# Patient Record
Sex: MALE | Race: WHITE | NOT HISPANIC OR LATINO | Employment: FULL TIME | URBAN - METROPOLITAN AREA
[De-identification: names, ages, dates, MRNs, and addresses within clinical notes are randomized per-mention and may not be internally consistent; named-entity substitution may affect disease eponyms.]

---

## 2017-08-01 ENCOUNTER — OFFICE VISIT (OUTPATIENT)
Dept: FAMILY MEDICINE | Facility: CLINIC | Age: 42
End: 2017-08-01
Payer: COMMERCIAL

## 2017-08-01 VITALS
SYSTOLIC BLOOD PRESSURE: 144 MMHG | BODY MASS INDEX: 34.26 KG/M2 | DIASTOLIC BLOOD PRESSURE: 98 MMHG | HEIGHT: 70 IN | TEMPERATURE: 98 F | HEART RATE: 80 BPM | WEIGHT: 239.31 LBS

## 2017-08-01 DIAGNOSIS — R63.2 POLYPHAGIA(783.6): ICD-10-CM

## 2017-08-01 DIAGNOSIS — R73.09 HEMOGLOBIN A1C 8.0 PERCENT OR GREATER: ICD-10-CM

## 2017-08-01 DIAGNOSIS — R63.1 POLYDIPSIA: ICD-10-CM

## 2017-08-01 DIAGNOSIS — R73.9 ELEVATED BLOOD SUGAR: ICD-10-CM

## 2017-08-01 DIAGNOSIS — R35.89 POLYURIA: ICD-10-CM

## 2017-08-01 DIAGNOSIS — E11.9 NEWLY DIAGNOSED DIABETES: Primary | ICD-10-CM

## 2017-08-01 LAB
CREAT UR-MCNC: 66 MG/DL
MICROALBUMIN UR DL<=1MG/L-MCNC: 7 UG/ML
MICROALBUMIN/CREATININE RATIO: 10.6 UG/MG

## 2017-08-01 PROCEDURE — 99213 OFFICE O/P EST LOW 20 MIN: CPT | Mod: S$GLB,,, | Performed by: NURSE PRACTITIONER

## 2017-08-01 PROCEDURE — 82570 ASSAY OF URINE CREATININE: CPT

## 2017-08-01 PROCEDURE — 99999 PR PBB SHADOW E&M-EST. PATIENT-LVL III: CPT | Mod: PBBFAC,,, | Performed by: NURSE PRACTITIONER

## 2017-08-01 RX ORDER — CYCLOBENZAPRINE HCL 10 MG
10 TABLET ORAL
COMMUNITY

## 2017-08-01 NOTE — PROGRESS NOTES
Subjective:       Patient ID: Jaime Kennedy is a 42 y.o. male.    Chief Complaint: Abnormal Lab (elevated sugar per Dr Godwin (HgbA1C  >11)_)    Diabetes   He presents for his initial diabetic visit. He has type 2 diabetes mellitus. No MedicAlert identification noted. Onset time: Hgb A1C checked by urologist this week; 11.4. Disease course: New onset. There are no hypoglycemic associated symptoms. Associated symptoms include polydipsia, polyphagia and polyuria. Pertinent negatives for diabetes include no blurred vision, no chest pain, no fatigue, no foot paresthesias, no foot ulcerations, no visual change, no weakness and no weight loss. There are no hypoglycemic complications. Diabetic symptom progression: New onset. There are no diabetic complications. Risk factors for coronary artery disease include diabetes mellitus, male sex and obesity. When asked about current treatments, none (Newly diagnosed; repeat hgb a1c, fasting BG ordered) were reported.   Pt informed regimen will begin following review of lab results. Extensive education provided r/t new onset diabetes. Pt informed ACE inhibitor, ASA recommended with diabetes diagnosis. Pt also informed yearly eye and foot exams needed. Metformin uses, potential side effects discussed; informed will begin treatment with this medication; referral to diabetic education will also be provided. Glucometer kit to be ordered; recommend AC/HS glucose monitoring; informed will teach glucometer use. Pt verbalized understanding.   History reviewed. No pertinent past medical history.  Social History     Social History    Marital status:      Spouse name: N/A    Number of children: N/A    Years of education: N/A     Occupational History         Social History Main Topics    Smoking status: Never Smoker    Smokeless tobacco: Not on file    Alcohol use Not on file    Drug use: Unknown    Sexual activity: Not on file     Social History Narrative         Past Surgical  History:   Procedure Laterality Date    FRACTURE SURGERY      tendonitis surgery         Review of Systems   Constitutional: Negative.  Negative for fatigue and weight loss.   HENT: Negative.    Eyes: Negative.  Negative for blurred vision.   Respiratory: Negative.    Cardiovascular: Negative.  Negative for chest pain.   Gastrointestinal: Negative.    Endocrine: Positive for polydipsia, polyphagia and polyuria.   Genitourinary: Negative.    Musculoskeletal: Negative.    Skin: Negative.    Allergic/Immunologic: Negative.    Neurological: Negative.  Negative for weakness.   Psychiatric/Behavioral: Negative.        Objective:      Physical Exam   Constitutional: He is oriented to person, place, and time. He appears well-developed and well-nourished.   HENT:   Head: Normocephalic.   Right Ear: External ear normal.   Left Ear: External ear normal.   Nose: Nose normal.   Mouth/Throat: Oropharynx is clear and moist.   Eyes: Conjunctivae are normal. Pupils are equal, round, and reactive to light.   Neck: Normal range of motion. Neck supple.   Cardiovascular: Normal rate, regular rhythm and normal heart sounds.    Pulmonary/Chest: Effort normal and breath sounds normal.   Abdominal: Soft. Bowel sounds are normal.   Musculoskeletal: Normal range of motion.   Neurological: He is alert and oriented to person, place, and time.   Skin: Skin is warm and dry. Capillary refill takes 2 to 3 seconds.   Psychiatric: He has a normal mood and affect. His behavior is normal. Judgment and thought content normal.   Nursing note and vitals reviewed.      Assessment:       1. Newly diagnosed diabetes    2. Hemoglobin A1c 8.0 percent or greater    3. Elevated blood sugar    4. Polyuria    5. Polydipsia    6. Polyphagia        Plan:           Jaime was seen today for abnormal lab.    Diagnoses and all orders for this visit:    Newly diagnosed diabetes  Hemoglobin A1c 8.0 percent or greater  Elevated blood  sugar  Polyuria  Polydipsia  Polyphagia  -     Hemoglobin A1c; Future  -     Basic metabolic panel; Future  -     MICROALBUMIN / CREATININE RATIO URINE

## 2017-08-02 ENCOUNTER — LAB VISIT (OUTPATIENT)
Dept: LAB | Facility: HOSPITAL | Age: 42
End: 2017-08-02
Attending: NURSE PRACTITIONER
Payer: COMMERCIAL

## 2017-08-02 DIAGNOSIS — R63.2 POLYPHAGIA(783.6): ICD-10-CM

## 2017-08-02 DIAGNOSIS — Z79.899 ENCOUNTER FOR LONG-TERM (CURRENT) USE OF OTHER MEDICATIONS: Primary | ICD-10-CM

## 2017-08-02 DIAGNOSIS — R73.09 HEMOGLOBIN A1C 8.0 PERCENT OR GREATER: ICD-10-CM

## 2017-08-02 DIAGNOSIS — R35.89 POLYURIA: ICD-10-CM

## 2017-08-02 DIAGNOSIS — R73.9 ELEVATED BLOOD SUGAR: ICD-10-CM

## 2017-08-02 DIAGNOSIS — R63.1 POLYDIPSIA: ICD-10-CM

## 2017-08-02 DIAGNOSIS — E29.1 OTHER TESTICULAR HYPOFUNCTION: ICD-10-CM

## 2017-08-02 DIAGNOSIS — E11.9 NEWLY DIAGNOSED DIABETES: ICD-10-CM

## 2017-08-02 LAB
ANION GAP SERPL CALC-SCNC: 9 MMOL/L
BUN SERPL-MCNC: 10 MG/DL
CALCIUM SERPL-MCNC: 9 MG/DL
CHLORIDE SERPL-SCNC: 100 MMOL/L
CO2 SERPL-SCNC: 26 MMOL/L
CREAT SERPL-MCNC: 0.8 MG/DL
EST. GFR  (AFRICAN AMERICAN): >60 ML/MIN/1.73 M^2
EST. GFR  (NON AFRICAN AMERICAN): >60 ML/MIN/1.73 M^2
FSH SERPL-ACNC: 6.6 MIU/ML
GLUCOSE SERPL-MCNC: 254 MG/DL
HCT VFR BLD AUTO: 46 %
HGB BLD-MCNC: 16.6 G/DL
LH SERPL-ACNC: 3.6 MIU/ML
POTASSIUM SERPL-SCNC: 4.4 MMOL/L
PROLACTIN SERPL IA-MCNC: 10.4 NG/ML
SODIUM SERPL-SCNC: 135 MMOL/L
TESTOST SERPL-MCNC: 306 NG/DL

## 2017-08-02 PROCEDURE — 83036 HEMOGLOBIN GLYCOSYLATED A1C: CPT

## 2017-08-02 PROCEDURE — 36415 COLL VENOUS BLD VENIPUNCTURE: CPT | Mod: PO

## 2017-08-02 PROCEDURE — 85014 HEMATOCRIT: CPT

## 2017-08-02 PROCEDURE — 84403 ASSAY OF TOTAL TESTOSTERONE: CPT

## 2017-08-02 PROCEDURE — 83002 ASSAY OF GONADOTROPIN (LH): CPT

## 2017-08-02 PROCEDURE — 85018 HEMOGLOBIN: CPT

## 2017-08-02 PROCEDURE — 84146 ASSAY OF PROLACTIN: CPT

## 2017-08-02 PROCEDURE — 80048 BASIC METABOLIC PNL TOTAL CA: CPT

## 2017-08-02 PROCEDURE — 83001 ASSAY OF GONADOTROPIN (FSH): CPT

## 2017-08-03 LAB
ESTIMATED AVG GLUCOSE: 272 MG/DL
HBA1C MFR BLD HPLC: 11.1 %

## 2017-08-03 RX ORDER — LISINOPRIL 2.5 MG/1
2.5 TABLET ORAL DAILY
Qty: 90 TABLET | Refills: 3 | Status: SHIPPED | OUTPATIENT
Start: 2017-08-03 | End: 2018-08-03

## 2017-08-03 RX ORDER — METFORMIN HYDROCHLORIDE 500 MG/1
500 TABLET ORAL 2 TIMES DAILY WITH MEALS
Qty: 180 TABLET | Refills: 3 | Status: SHIPPED | OUTPATIENT
Start: 2017-08-03 | End: 2017-08-24

## 2017-08-03 RX ORDER — ASPIRIN 81 MG/1
81 TABLET ORAL DAILY
Qty: 90 TABLET | Refills: 0 | Status: SHIPPED | OUTPATIENT
Start: 2017-08-03 | End: 2018-08-03

## 2017-08-03 RX ORDER — INSULIN PUMP SYRINGE, 3 ML
EACH MISCELLANEOUS
Qty: 1 EACH | Refills: 0 | Status: SHIPPED | OUTPATIENT
Start: 2017-08-03 | End: 2018-08-03

## 2017-08-03 RX ORDER — ASPIRIN 81 MG/1
81 TABLET ORAL DAILY
Refills: 0 | COMMUNITY
Start: 2017-08-03 | End: 2017-08-03 | Stop reason: SDUPTHER

## 2017-08-03 NOTE — TELEPHONE ENCOUNTER
----- Message from Delmi Fisher sent at 8/3/2017 12:34 PM CDT -----  Contact: pt   States he's calling to find out if there is a specific time on tomorrow he is to come in tomorrow or whom he should see and can be reached at [phone]//[email or web address]

## 2017-08-23 ENCOUNTER — TELEPHONE (OUTPATIENT)
Dept: FAMILY MEDICINE | Facility: CLINIC | Age: 42
End: 2017-08-23

## 2017-08-23 ENCOUNTER — PATIENT MESSAGE (OUTPATIENT)
Dept: FAMILY MEDICINE | Facility: CLINIC | Age: 42
End: 2017-08-23

## 2017-08-23 ENCOUNTER — LAB VISIT (OUTPATIENT)
Dept: LAB | Facility: HOSPITAL | Age: 42
End: 2017-08-23
Attending: FAMILY MEDICINE
Payer: COMMERCIAL

## 2017-08-23 ENCOUNTER — OFFICE VISIT (OUTPATIENT)
Dept: FAMILY MEDICINE | Facility: CLINIC | Age: 42
End: 2017-08-23
Payer: COMMERCIAL

## 2017-08-23 VITALS
WEIGHT: 244 LBS | TEMPERATURE: 98 F | DIASTOLIC BLOOD PRESSURE: 82 MMHG | HEIGHT: 70 IN | SYSTOLIC BLOOD PRESSURE: 138 MMHG | BODY MASS INDEX: 34.93 KG/M2 | HEART RATE: 83 BPM

## 2017-08-23 PROBLEM — E78.1 HYPERTRIGLYCERIDEMIA: Status: ACTIVE | Noted: 2017-08-23

## 2017-08-23 LAB
CHOLEST/HDLC SERPL: 5.5 {RATIO}
HDL/CHOLESTEROL RATIO: 18.1 %
HDLC SERPL-MCNC: 155 MG/DL
HDLC SERPL-MCNC: 28 MG/DL
LDLC SERPL CALC-MCNC: 59.2 MG/DL
NONHDLC SERPL-MCNC: 127 MG/DL
TRIGL SERPL-MCNC: 339 MG/DL

## 2017-08-23 PROCEDURE — 90471 IMMUNIZATION ADMIN: CPT | Mod: S$GLB,,, | Performed by: FAMILY MEDICINE

## 2017-08-23 PROCEDURE — 3046F HEMOGLOBIN A1C LEVEL >9.0%: CPT | Mod: S$GLB,,, | Performed by: FAMILY MEDICINE

## 2017-08-23 PROCEDURE — 80061 LIPID PANEL: CPT

## 2017-08-23 PROCEDURE — 99214 OFFICE O/P EST MOD 30 MIN: CPT | Mod: S$GLB,,, | Performed by: FAMILY MEDICINE

## 2017-08-23 PROCEDURE — 36415 COLL VENOUS BLD VENIPUNCTURE: CPT | Mod: PO

## 2017-08-23 PROCEDURE — 4010F ACE/ARB THERAPY RXD/TAKEN: CPT | Mod: S$GLB,,, | Performed by: FAMILY MEDICINE

## 2017-08-23 PROCEDURE — 99999 PR PBB SHADOW E&M-EST. PATIENT-LVL IV: CPT | Mod: PBBFAC,,, | Performed by: FAMILY MEDICINE

## 2017-08-23 PROCEDURE — 90732 PPSV23 VACC 2 YRS+ SUBQ/IM: CPT | Mod: S$GLB,,, | Performed by: FAMILY MEDICINE

## 2017-08-23 PROCEDURE — 3008F BODY MASS INDEX DOCD: CPT | Mod: S$GLB,,, | Performed by: FAMILY MEDICINE

## 2017-08-23 PROCEDURE — 83036 HEMOGLOBIN GLYCOSYLATED A1C: CPT

## 2017-08-23 RX ORDER — LANCETS
1 EACH MISCELLANEOUS DAILY PRN
Qty: 100 EACH | Refills: 11 | Status: SHIPPED | OUTPATIENT
Start: 2017-08-23

## 2017-08-23 NOTE — PROGRESS NOTES
Subjective:      Patient ID: Jaime Kennedy is a 42 y.o. male.    Chief Complaint: diabetes.  HPI he is a new diabetic and he has not been checking his sugars.  We discussed how to use the glucometer.  He was put on metformin 500 mg po bid.   Since he was diagnosed with diabetes, he has changed the way that he has been eating.  He states that he has not had low sugar symtpoms.     Lab Results   Component Value Date    HGBA1C 11.1 (H) 08/02/2017     He has changed from eating a lot of carbs. He now eats oatmeal with nuts and fruit or cheese grits and eggs. He has been only drinking coffee with sweetener and not sugar, tea with a sweetener and he rarely drinks a coke or pop.  He drinks a lot of water now.  He rarely eats fried foods.  He travels a lot.  He is being more mindful of where he is eating.  He has cut out cookies.  He snacks on almonds.    She has a family history of diabetes.     Health Maintenance Due   Topic Date Due    Lipid Panel  1974    Foot Exam  02/11/1984    Eye Exam  02/11/1984    TETANUS VACCINE  02/11/1992    Pneumococcal PPSV23 (Medium Risk) (1) 02/11/1992    Influenza Vaccine  08/01/2017       Past Medical History:  History reviewed. No pertinent past medical history.  Past Surgical History:   Procedure Laterality Date    FRACTURE SURGERY      tendonitis surgery       Review of patient's allergies indicates:  No Known Allergies  Current Outpatient Prescriptions on File Prior to Visit   Medication Sig Dispense Refill    aspirin (ECOTRIN) 81 MG EC tablet Take 1 tablet (81 mg total) by mouth once daily. 90 tablet 0    blood-glucose meter kit Use as instructed 1 each 0    cyclobenzaprine (FLEXERIL) 10 MG tablet Take 10 mg by mouth.      fluticasone (FLONASE) 50 mcg/actuation nasal spray 2 sprays by Each Nare route once daily. 16 g 12    lisinopril (PRINIVIL,ZESTRIL) 2.5 MG tablet Take 1 tablet (2.5 mg total) by mouth once daily. 90 tablet 3    metformin (GLUCOPHAGE) 500 MG  "tablet Take 1 tablet (500 mg total) by mouth 2 (two) times daily with meals. 180 tablet 3    levocetirizine (XYZAL) 5 MG tablet Take 1 tablet (5 mg total) by mouth every evening. 14 tablet 0     No current facility-administered medications on file prior to visit.      Social History     Social History    Marital status:      Spouse name: N/A    Number of children: N/A    Years of education: N/A     Occupational History    Not on file.     Social History Main Topics    Smoking status: Never Smoker    Smokeless tobacco: Not on file    Alcohol use Not on file    Drug use: Unknown    Sexual activity: Not on file     Other Topics Concern    Not on file     Social History Narrative    No narrative on file     Family History   Problem Relation Age of Onset    No Known Problems Mother     Heart disease Father     Diabetes Maternal Grandfather              Review of Systems   Constitutional: Negative.  Negative for chills, diaphoresis and fever.   HENT: Negative for congestion, hearing loss, mouth sores, postnasal drip and sore throat.    Eyes: Negative for pain and visual disturbance.   Respiratory: Negative for cough, chest tightness, shortness of breath and wheezing.    Cardiovascular: Negative for chest pain.   Gastrointestinal: Negative for abdominal pain, anal bleeding, blood in stool, constipation, diarrhea, nausea and vomiting.   Genitourinary: Negative for dysuria and hematuria.   Musculoskeletal: Negative for back pain, neck pain and neck stiffness.   Skin: Negative for rash.   Neurological: Negative for dizziness and weakness.       Objective:   /82   Pulse 83   Temp 98.3 °F (36.8 °C) (Oral)   Ht 5' 10" (1.778 m)   Wt 110.7 kg (244 lb)   BMI 35.01 kg/m²     Physical Exam   Constitutional: He is oriented to person, place, and time. He appears well-developed and well-nourished.   Cardiovascular:   Pulses:       Dorsalis pedis pulses are 2+ on the right side, and 2+ on the left side. "        Posterior tibial pulses are 2+ on the right side, and 2+ on the left side.   Musculoskeletal:        Right foot: There is normal range of motion and no deformity.        Left foot: There is normal range of motion and no deformity.   Feet:   Right Foot:   Protective Sensation: 10 sites tested. 10 sites sensed.   Skin Integrity: Negative for ulcer, blister, skin breakdown, erythema, warmth, callus or dry skin.   Left Foot:   Protective Sensation: 10 sites tested. 10 sites sensed.   Skin Integrity: Negative for ulcer, blister, skin breakdown, erythema, warmth, callus or dry skin.   Neurological: He is alert and oriented to person, place, and time.       Assessment:     1. Uncontrolled type 2 diabetes mellitus without complication, without long-term current use of insulin        Plan:   Diagnoses and all orders for this visit:    Uncontrolled type 2 diabetes mellitus without complication, without long-term current use of insulin  -     lancets Misc; 1 Units by Misc.(Non-Drug; Combo Route) route daily as needed.  -     glucose urine test-glucose ox (NO-STICK GLUCOSE) Strp; Use 1 qid  -     MyChart Patient Entered Glucose  -     Lipid panel; Future  -     Pneumococcal Polysaccharide Vaccine (23 Valent) (SQ/IM)  -     Ambulatory Referral to Diabetes Education  -     Hemoglobin A1c; Future  -     Ambulatory referral to Optometry

## 2017-08-24 ENCOUNTER — TELEPHONE (OUTPATIENT)
Dept: FAMILY MEDICINE | Facility: CLINIC | Age: 42
End: 2017-08-24

## 2017-08-24 ENCOUNTER — OFFICE VISIT (OUTPATIENT)
Dept: DIABETES | Facility: CLINIC | Age: 42
End: 2017-08-24
Payer: COMMERCIAL

## 2017-08-24 VITALS
SYSTOLIC BLOOD PRESSURE: 146 MMHG | DIASTOLIC BLOOD PRESSURE: 96 MMHG | HEIGHT: 70 IN | WEIGHT: 244.19 LBS | BODY MASS INDEX: 34.96 KG/M2

## 2017-08-24 DIAGNOSIS — E78.1 HYPERTRIGLYCERIDEMIA: ICD-10-CM

## 2017-08-24 DIAGNOSIS — E78.5 HYPERLIPIDEMIA, UNSPECIFIED HYPERLIPIDEMIA TYPE: Primary | ICD-10-CM

## 2017-08-24 DIAGNOSIS — R73.9 HYPERGLYCEMIA: Primary | ICD-10-CM

## 2017-08-24 DIAGNOSIS — E66.9 OBESITY (BMI 30-39.9): ICD-10-CM

## 2017-08-24 LAB
ESTIMATED AVG GLUCOSE: 226 MG/DL
GLUCOSE SERPL-MCNC: 256 MG/DL (ref 70–110)
HBA1C MFR BLD HPLC: 9.5 %

## 2017-08-24 PROCEDURE — 3046F HEMOGLOBIN A1C LEVEL >9.0%: CPT | Mod: S$GLB,,, | Performed by: NURSE PRACTITIONER

## 2017-08-24 PROCEDURE — 82948 REAGENT STRIP/BLOOD GLUCOSE: CPT | Mod: S$GLB,,, | Performed by: NURSE PRACTITIONER

## 2017-08-24 PROCEDURE — 3008F BODY MASS INDEX DOCD: CPT | Mod: S$GLB,,, | Performed by: NURSE PRACTITIONER

## 2017-08-24 PROCEDURE — 99214 OFFICE O/P EST MOD 30 MIN: CPT | Mod: S$GLB,,, | Performed by: NURSE PRACTITIONER

## 2017-08-24 PROCEDURE — 99999 PR PBB SHADOW E&M-EST. PATIENT-LVL III: CPT | Mod: PBBFAC,,, | Performed by: NURSE PRACTITIONER

## 2017-08-24 PROCEDURE — 4010F ACE/ARB THERAPY RXD/TAKEN: CPT | Mod: S$GLB,,, | Performed by: NURSE PRACTITIONER

## 2017-08-24 RX ORDER — METFORMIN HYDROCHLORIDE 1000 MG/1
1000 TABLET ORAL 2 TIMES DAILY WITH MEALS
Qty: 180 TABLET | Refills: 3 | Status: SHIPPED | OUTPATIENT
Start: 2017-08-24 | End: 2017-12-26 | Stop reason: SDUPTHER

## 2017-08-24 NOTE — PROGRESS NOTES
Use a low triglyceride diet to lower this triglyceride which is high.  To do this avoid fried foods, fast food, red meats and use more plants in your diet.  Start two over the counter fish oils tabs a day and recheck a cholesterol panel in 6 months.

## 2017-08-24 NOTE — PROGRESS NOTES
Change the metformin to 1000 mg po bid #60 and rf x 2 and recheck an a1c in 3 months.  Continue with plans to send me results of using the glucometer.  Call him and ask him if he would like for us to arrange for him to get educated on how to use the glucometer by the ancillary nurse.  I forgot to get this arranged yesterday when I spoke with him.

## 2017-08-24 NOTE — TELEPHONE ENCOUNTER
Please order the changes in meds and pend them to me when we do change a med or delete the old ones off of the medcard if we change to something else.     I have signed for the following orders AND/OR meds.  Please call the patient and ask the patient to schedule the testing AND/OR inform about any medications that were sent.      Orders Placed This Encounter   Procedures    Lipid panel     Standing Status:   Future     Standing Expiration Date:   10/23/2018    Hepatic function panel     Standing Status:   Future     Standing Expiration Date:   10/23/2018         Medications Ordered This Encounter      metformin (GLUCOPHAGE) 1000 MG tablet          Sig: Take 1 tablet (1,000 mg total) by mouth 2 (two) times daily with meals.          Dispense:  180 tablet          Refill:  3

## 2017-08-24 NOTE — TELEPHONE ENCOUNTER
----- Message from Manuel Morales MD sent at 8/23/2017 10:00 PM CDT -----  Use a low triglyceride diet to lower this triglyceride which is high.  To do this avoid fried foods, fast food, red meats and use more plants in your diet.  Start two over the counter fish oils tabs a day and recheck a cholesterol panel in 6 months.

## 2017-08-24 NOTE — LETTER
August 24, 2017      Maunel Morales MD  [address] [facility] [address]           [facility]  [address] [facility] [address]  Phone: [phone]  Fax: [phone]          Patient: Jaime Kennedy   MR Number: [id number]   YOB: 1975   Date of Visit: 8/24/2017       Dear Dr. Manuel Morales:    Thank you for referring Jaime Kennedy to me for evaluation. Attached you will find relevant portions of my assessment and plan of care.    If you have questions, please do not hesitate to call me. I look forward to following Jaime Kennedy along with you.    Sincerely,    Rachael Cartagena, HAYLEEC, CDE    Enclosure  CC:  No Recipients    If you would like to receive this communication electronically, please contact [email or web address] or [phone] to request more information on [facility] Link access.    For providers and/or their staff who would like to refer a patient to [facility], please contact us through our one-stop-shop provider referral line, [facility] , at [phone].    If you feel you have received this communication in error or would no longer like to receive these types of communications, please e-mail [email or web address]

## 2017-08-24 NOTE — PROGRESS NOTES
"PCP: Manuel Morales MD  //  Dr. Jamar Godwin ( urology )    Subjective:     Chief Complaint: Diabetes, establish care    HISTORY OF PRESENT ILLNESS: 42 year old  male presenting to establish care for diabetes.  Patient was recently diagnosed with  Type II diabetes and has the following complications: none. He  is to be enrolled in diabetes education classes.  Blood glucose testing is not performed, but patient has meter with him today.      He needs education on how to use glucometer.  He denies any recent hospital admissions, emergency room visits, or hypoglycemia.  He is scheduled for a cruise in the next coming weeks.    Height: 5' 10" (177.8 cm)  //  Weight: 110.8 kg (244 lb 3.2 oz), Body mass index is 35.04 kg/m².  Home Blood Glucose reading this AM: Not Taken  His blood sugar in clinic today is:    Lab Results   Component Value Date    POCGLU 256 (A) 08/24/2017     Labs Reviewed. ADA recommends A1C of less than 7 %. His most recent A1C is:     Lab Results   Component Value Date    HGBA1C 9.5 (H) 08/23/2017      DM MEDICATIONS:   Metformin 1,000 mg BID    Review of Systems   Constitutional: Negative for appetite change, diaphoresis, fatigue and unexpected weight change.   HENT: Negative for congestion, hearing loss, rhinorrhea, sneezing and sore throat.    Eyes: Negative for visual disturbance.   Respiratory: Negative for cough, shortness of breath and wheezing.    Cardiovascular: Negative for leg swelling.   Gastrointestinal: Negative for abdominal pain, constipation, diarrhea, nausea and vomiting.   Endocrine: Positive for polydipsia. Negative for cold intolerance, heat intolerance, polyphagia and polyuria.   Genitourinary: Negative for difficulty urinating, dysuria and urgency.   Skin: Negative for color change, pallor and wound.   Neurological: Negative for dizziness, seizures, syncope, numbness and headaches.   Psychiatric/Behavioral: Negative for confusion and decreased concentration. The " patient is not nervous/anxious.        STANDARDS OF CARE:  Current Ophthalmologist / Optometrist: Robert Elder, Last exam November 2016  Current Podiatrist: None  Recommend regular exams and denies gums bleeding.    ACTIVITY LEVEL: Rarely Active  EXERCISE:  None  MEAL PLANNING: Patient reports number of meals per day to be 3 and number of snacks per day to be 2.  Breakfast can be oatmeal, nuts & fruits OR cheese grits, eggs.  Lunch or dinner can be onion, bell pepper, steak w/ wheat chips OR spaghetti squash OR [employer] 's pizza.  Beverages includes mostly water, unsweet tea with artifical.  Patient is encouraged to consume 45 - 60 grams of carbohydrates in each meal, and 1800 k / romain per day.      Per dietary recall, patient is not limiting carbohydrates, saturated fats and sodium.     BLOOD GLUCOSE TESTING:  Has TRUE METRIX meter  SOCIAL HISTORY: . Lives with spouse.  Works in the plant.  Never smoker.  Has occasional whiskey.    Objective:      Physical Exam   Constitutional: He is oriented to person, place, and time. He appears well-developed and well-nourished.   HENT:   Head: Normocephalic and atraumatic.   Eyes: EOM are normal. Pupils are equal, round, and reactive to light.   Neck: Normal range of motion. No tracheal deviation present.   Cardiovascular: Normal rate, regular rhythm and intact distal pulses.  Exam reveals no friction rub.    No murmur heard.  Pulses:       Dorsalis pedis pulses are 2+ on the right side, and 2+ on the left side.   Pulmonary/Chest: Effort normal and breath sounds normal. He has no wheezes.   Abdominal: Soft. Bowel sounds are normal. He exhibits no distension. There is no tenderness.   Musculoskeletal: Normal range of motion. He exhibits no edema or deformity.   Feet:   Right Foot:   Protective Sensation: 6 sites tested. 6 sites sensed.   Skin Integrity: Negative for ulcer, blister, skin breakdown, erythema, warmth, callus or dry skin.   Left Foot:   Protective Sensation: 6  sites tested. 6 sites sensed.   Skin Integrity: Negative for ulcer, blister, skin breakdown, erythema, warmth, callus or dry skin.   Neurological: He is alert and oriented to person, place, and time.   Skin: Skin is warm and dry. No rash noted.   Psychiatric: He has a normal mood and affect. His behavior is normal. Judgment and thought content normal.       Assessment / Plan:     1.) Uncontrolled type 2 diabetes mellitus without complication, without long-term current use of insulin  Comments:  - Continue metformin 1,000 mg BID.  He has not started monitoring blood sugars,but plans to start checking BID.  In 3 weeks, patient will send BG readings via my [facility] so we can  reassess readings and if not at goal ( see below ), will plan  to start SGLT-2 to help lower blood sugars.    Orders:  -     POCT glucose    2.) Hypertriglyceridemia    3.) Obesity (BMI 30-39.9)    Additional Plan Details:    1.) Patient was instructed to monitor blood glucose 2 x daily, fasting and pp meal.  Discussed ADA goal for fasting blood sugar, 80 - 130 mg/dL; pp blood sugars below 180 mg/dl. Also, discussed prevention of hypoglycemia and the need to adjust goals to higher levels if persistent hypoglycemia.  Reminded to bring BG records or meter to each visit for review.  In clinic today, I demonstrated how to use the glucometer and he voiced understanding.   2.) Reviewed pathophysiology of diabetes, complications related to the disease, importance of annual dilated eye exam and daily foot examination.  3.) We discussed the ADA recommendations: Hemoglobin A1c below 7.0 %. All patients with diabetes should be on statins unless contraindicated.  ACE or ARB therapy if not contraindicated.    4.) Meal planning teaching: Carbohydrate definition - one serving is 15 gms. Carbohydrate spacing - carbohydrates should be spaced into approximately 3 meals with 2 snacks ( of one carbohydrate ) between meals or at bedtime. Increase vegetable intake to 2  or more cups of vegetables per day as well as 2 fruit servings. Recommended low saturated fat, low sodium diet to aid in control of hypertension and cholesterol.  5.) Discussed activity, benefits, methods, and precautions. Recommended patient start or continue some form of exercise and increase as tolerated to 30 minutes per day to facilitate weight loss and aid in control of BGs.  6.) Return to clinic in 3 months for follow up. He was explained the above plan and given opportunity to ask questions. Advised to call clinic with any further questions or concerns.    Rachael Cartagena, SADIA-C, CDE    A total of 60 minutes was spent in face to face time, of which over 50 % was spent in counseling patient on disease process, complications, treatment, and side effects of medications.

## 2017-08-24 NOTE — TELEPHONE ENCOUNTER
----- Message from Manuel Morales MD sent at 8/24/2017  6:59 AM CDT -----  Change the metformin to 1000 mg po bid #60 and rf x 2 and recheck an a1c in 3 months.  Continue with plans to send me results of using the glucometer.  Call him and ask him if he would like for us to arrange for him to get educated on how to use the glucometer by the ancillary nurse.  I forgot to get this arranged yesterday when I spoke with him.

## 2017-08-24 NOTE — TELEPHONE ENCOUNTER
I have signed for the following orders AND/OR meds.  Please call the patient and ask the patient to schedule the testing AND/OR inform about any medications that were sent.      Orders Placed This Encounter   Procedures    Hemoglobin A1c     Standing Status:   Future     Standing Expiration Date:   10/23/2018

## 2017-09-21 ENCOUNTER — TELEPHONE (OUTPATIENT)
Dept: DIABETES | Facility: CLINIC | Age: 42
End: 2017-09-21

## 2017-09-21 NOTE — TELEPHONE ENCOUNTER
I called patient and spoke with him regarding blood sugar readings.  He stated that fasting BG are 160 s - 180 s; and afternoon 130 s - 180 s.  He is still taking metformin.  Once again, I strongly suggested starting SGLT-2, but patient wants to monitor his BG another 2 weeks and call back with readings.  He voices he just went on a cruise and was not following his diet plan.

## 2017-10-10 ENCOUNTER — TELEPHONE (OUTPATIENT)
Dept: OPHTHALMOLOGY | Facility: CLINIC | Age: 42
End: 2017-10-10

## 2017-10-11 ENCOUNTER — TELEPHONE (OUTPATIENT)
Dept: DIABETES | Facility: CLINIC | Age: 42
End: 2017-10-11

## 2017-10-11 NOTE — TELEPHONE ENCOUNTER
Attempted to call patient to inquire about blood sugar readings, but no response. Left VM to call back with BG readings so we can adjust medications as needed.

## 2017-11-20 ENCOUNTER — PATIENT MESSAGE (OUTPATIENT)
Dept: FAMILY MEDICINE | Facility: CLINIC | Age: 42
End: 2017-11-20

## 2017-12-26 RX ORDER — METFORMIN HYDROCHLORIDE 1000 MG/1
1000 TABLET ORAL 2 TIMES DAILY WITH MEALS
Qty: 60 TABLET | Refills: 0 | Status: SHIPPED | OUTPATIENT
Start: 2017-12-26 | End: 2018-12-26

## 2017-12-26 NOTE — TELEPHONE ENCOUNTER
Health Maintenance Due   Topic Date Due    Eye Exam  02/11/1984    TETANUS VACCINE  02/11/1992    Influenza Vaccine  08/01/2017    Hemoglobin A1c  11/23/2017       The a1c, flu shot and eye exams are due.  Please arrange.

## 2019-01-08 ENCOUNTER — PATIENT OUTREACH (OUTPATIENT)
Dept: ADMINISTRATIVE | Facility: HOSPITAL | Age: 44
End: 2019-01-08

## 2019-04-08 ENCOUNTER — PATIENT OUTREACH (OUTPATIENT)
Dept: ADMINISTRATIVE | Facility: HOSPITAL | Age: 44
End: 2019-04-08

## 2019-04-16 ENCOUNTER — PATIENT OUTREACH (OUTPATIENT)
Dept: ADMINISTRATIVE | Facility: HOSPITAL | Age: 44
End: 2019-04-16

## 2019-05-16 ENCOUNTER — PATIENT OUTREACH (OUTPATIENT)
Dept: ADMINISTRATIVE | Facility: HOSPITAL | Age: 44
End: 2019-05-16

## 2019-05-30 ENCOUNTER — PATIENT OUTREACH (OUTPATIENT)
Dept: ADMINISTRATIVE | Facility: HOSPITAL | Age: 44
End: 2019-05-30

## 2019-05-30 NOTE — PROGRESS NOTES
Spoke w/ pt re A1c lab that's overdue, pt stated he is at work right now and will give me a call back later to discuss.

## 2019-08-01 ENCOUNTER — PATIENT OUTREACH (OUTPATIENT)
Dept: ADMINISTRATIVE | Facility: HOSPITAL | Age: 44
End: 2019-08-01

## 2022-07-08 ENCOUNTER — PATIENT MESSAGE (OUTPATIENT)
Dept: FAMILY MEDICINE | Facility: CLINIC | Age: 47
End: 2022-07-08
Payer: COMMERCIAL

## 2022-12-23 ENCOUNTER — PATIENT MESSAGE (OUTPATIENT)
Dept: FAMILY MEDICINE | Facility: CLINIC | Age: 47
End: 2022-12-23
Payer: COMMERCIAL

## 2024-09-11 ENCOUNTER — PATIENT MESSAGE (OUTPATIENT)
Dept: FAMILY MEDICINE | Facility: CLINIC | Age: 49
End: 2024-09-11
Payer: COMMERCIAL